# Patient Record
Sex: FEMALE | Race: WHITE | Employment: FULL TIME | URBAN - METROPOLITAN AREA
[De-identification: names, ages, dates, MRNs, and addresses within clinical notes are randomized per-mention and may not be internally consistent; named-entity substitution may affect disease eponyms.]

---

## 2021-03-24 ENCOUNTER — IMMUNIZATION (OUTPATIENT)
Dept: PRIMARY CARE CLINIC | Age: 56
End: 2021-03-24
Payer: COMMERCIAL

## 2021-03-24 PROCEDURE — 91301 COVID-19, MODERNA VACCINE 100MCG/0.5ML DOSE: CPT | Performed by: NURSE PRACTITIONER

## 2021-03-24 PROCEDURE — 0011A COVID-19, MODERNA VACCINE 100MCG/0.5ML DOSE: CPT | Performed by: NURSE PRACTITIONER

## 2021-04-21 ENCOUNTER — IMMUNIZATION (OUTPATIENT)
Dept: PRIMARY CARE CLINIC | Age: 56
End: 2021-04-21
Payer: COMMERCIAL

## 2021-04-21 PROCEDURE — 0012A COVID-19, MODERNA VACCINE 100MCG/0.5ML DOSE: CPT | Performed by: NURSE PRACTITIONER

## 2021-04-21 PROCEDURE — 91301 COVID-19, MODERNA VACCINE 100MCG/0.5ML DOSE: CPT | Performed by: NURSE PRACTITIONER

## 2022-03-10 ENCOUNTER — HOSPITAL ENCOUNTER (EMERGENCY)
Age: 57
Discharge: HOME | End: 2022-03-10
Payer: COMMERCIAL

## 2022-03-10 VITALS
OXYGEN SATURATION: 96 % | BODY MASS INDEX: 36.65 KG/M2 | RESPIRATION RATE: 18 BRPM | HEIGHT: 65 IN | WEIGHT: 220 LBS | HEART RATE: 99 BPM | TEMPERATURE: 98.8 F | SYSTOLIC BLOOD PRESSURE: 142 MMHG | DIASTOLIC BLOOD PRESSURE: 83 MMHG

## 2022-03-10 DIAGNOSIS — H65.02 NON-RECURRENT ACUTE SEROUS OTITIS MEDIA OF LEFT EAR: Primary | ICD-10-CM

## 2022-03-10 PROCEDURE — G0463 HOSPITAL OUTPT CLINIC VISIT: HCPCS

## 2022-03-10 PROCEDURE — 99211 OFF/OP EST MAY X REQ PHY/QHP: CPT

## 2022-03-10 RX ORDER — PREDNISONE 20 MG/1
TABLET ORAL
Qty: 10 TABLET | Refills: 0 | Status: SHIPPED | OUTPATIENT
Start: 2022-03-10 | End: 2022-03-15 | Stop reason: ALTCHOICE

## 2022-03-10 RX ORDER — ROSUVASTATIN CALCIUM 20 MG/1
20 TABLET, COATED ORAL DAILY
COMMUNITY

## 2022-03-10 RX ORDER — ACETAMINOPHEN 500 MG
1000 TABLET ORAL EVERY 6 HOURS PRN
COMMUNITY

## 2022-03-10 RX ORDER — PSEUDOEPHEDRINE HCL 60 MG/1
60 TABLET ORAL 2 TIMES DAILY
Qty: 10 TABLET | Refills: 0 | Status: SHIPPED | OUTPATIENT
Start: 2022-03-10 | End: 2022-03-15 | Stop reason: ALTCHOICE

## 2022-03-10 ASSESSMENT — PAIN - FUNCTIONAL ASSESSMENT: PAIN_FUNCTIONAL_ASSESSMENT: 0-10

## 2022-03-10 ASSESSMENT — PAIN DESCRIPTION - FREQUENCY: FREQUENCY: CONTINUOUS

## 2022-03-10 ASSESSMENT — PAIN DESCRIPTION - PROGRESSION: CLINICAL_PROGRESSION: GRADUALLY WORSENING

## 2022-03-10 ASSESSMENT — PAIN DESCRIPTION - LOCATION: LOCATION: EAR;NECK;THROAT

## 2022-03-10 ASSESSMENT — PAIN DESCRIPTION - PAIN TYPE: TYPE: ACUTE PAIN

## 2022-03-10 ASSESSMENT — PAIN DESCRIPTION - DESCRIPTORS: DESCRIPTORS: ACHING;PRESSURE;SORE

## 2022-03-10 ASSESSMENT — PAIN SCALES - GENERAL: PAINLEVEL_OUTOF10: 9

## 2022-03-10 ASSESSMENT — PAIN DESCRIPTION - ORIENTATION: ORIENTATION: LEFT

## 2022-03-10 ASSESSMENT — PAIN DESCRIPTION - ONSET: ONSET: GRADUAL

## 2022-03-10 NOTE — ED PROVIDER NOTES
[address] [facility]  Department of Emergency Medicine  UC Encounter Note  3/10/22   4:01 PM EST      NAME: Balta Degroot  :  1965  MRN:  [id number]    Chief Complaint: Otalgia (left ear), Neck Pain, and Pharyngitis      This is a 67-year-old female the presents to urgent care complaining of decreased hearing to her left ear. She does have history of conductive hearing loss in the past and has seen ENT for this. She does complain of a humming or ringing or even a high-pitched sound in the left ear at times. She says about a week and a half ago she was placed on an antibiotic because she was having URI symptoms and a cough. Those have improved for the most part however she has noticed that she has had some decreased hearing in the left ear more than her usual.  She does not wear hearing aids at this time. She denies any chest pain or shortness of breath. She does complain of a slight sore throat and some slight muscle neck discomfort. On first contact patient she appears to be in no acute distress. Review of Systems  Pertinent positives and negatives are stated within HPI, all other systems reviewed and are negative. Physical Exam  Vitals and nursing note reviewed. Constitutional:       Appearance: She is well-developed. HENT:      Head: Normocephalic and atraumatic. Jaw: There is normal jaw occlusion. Right Ear: Hearing and external ear normal. Tympanic membrane is bulging. Left Ear: Hearing and external ear normal. Tympanic membrane is bulging. Nose: Rhinorrhea present. No congestion. Right Sinus: No maxillary sinus tenderness or frontal sinus tenderness. Left Sinus: No maxillary sinus tenderness or frontal sinus tenderness. Mouth/Throat:      Mouth: Mucous membranes are moist. No angioedema. Pharynx: Oropharynx is clear. Uvula midline.    Eyes:      General: Lids are normal.      Conjunctiva/sclera: Conjunctivae normal. Pupils: Pupils are equal, round, and reactive to light. Cardiovascular:      Rate and Rhythm: Normal rate and regular rhythm. Heart sounds: Normal heart sounds. No murmur heard. Pulmonary:      Effort: Pulmonary effort is normal.      Breath sounds: Normal breath sounds. Abdominal:      General: Bowel sounds are normal.      Palpations: Abdomen is soft. Abdomen is not rigid. Tenderness: There is no abdominal tenderness. There is no guarding or rebound. Musculoskeletal:      Cervical back: Normal range of motion and neck supple. No edema. Muscular tenderness present. No spinous process tenderness. Skin:     General: Skin is warm and dry. Findings: No abrasion or rash. Neurological:      General: No focal deficit present. Mental Status: She is alert and oriented to person, place, and time. GCS: GCS eye subscore is 4. GCS verbal subscore is 5. GCS motor subscore is 6. Cranial Nerves: No cranial nerve deficit. Sensory: No sensory deficit. Coordination: Coordination normal.      Gait: Gait normal.         Procedures    MDM  Number of Diagnoses or Management Options  Non-recurrent acute serous otitis media of left ear  Diagnosis management comments: This is a patient that is in no acute distress. Looks like she had recovered from some type of upper respiratory and bronchitis problem recently. She does have some fluid buildup behind her TMs. This may be contributing to the decreased hearing also she does have a history of conductive hearing loss. She states in the past that she has benefited from steroid use. I will place her on a steroid also I will place her on a decongestant. Instructions given. Told to follow-up with her primary care provider or ENT on-call.   Instructions given.           --------------------------------------------- PAST HISTORY ---------------------------------------------  Past Medical History:  has a past medical history of Hyperlipidemia. Past Surgical History:  has a past surgical history that includes Ear surgery and  section. Social History:  reports that she has never smoked. She has never used smokeless tobacco.    Family History: family history is not on file. The patients home medications have been reviewed. Allergies: Codeine    -------------------------------------------------- RESULTS -------------------------------------------------  No results found for this visit on 03/10/22. No orders to display       ------------------------- NURSING NOTES AND VITALS REVIEWED ---------------------------   The nursing notes within the ED encounter and vital signs as below have been reviewed. BP (!) 142/83   Pulse 99   Temp 98.8 °F (37.1 °C) (Infrared)   Resp 18   Ht 5' 5\" (1.651 m)   Wt 220 lb (99.8 kg)   SpO2 96%   BMI 36.61 kg/m²   Oxygen Saturation Interpretation: Normal      ------------------------------------------ PROGRESS NOTES ------------------------------------------   I have spoken with the patient and discussed todays results, in addition to providing specific details for the plan of care and counseling regarding the diagnosis and prognosis. Their questions are answered at this time and they are agreeable with the plan.      --------------------------------- ADDITIONAL PROVIDER NOTES ---------------------------------     This patient is stable for discharge. I have shared the specific conditions for return, as well as the importance of follow-up. * NOTE: This report was transcribed using voice recognition software. Every effort was made to ensure accuracy; however, inadvertent computerized transcription errors may be present.    --------------------------------- IMPRESSION AND DISPOSITION ---------------------------------    IMPRESSION  1.  Non-recurrent acute serous otitis media of left ear        DISPOSITION  Disposition: Discharge to home  Patient condition is good        Magan CHAPARRO ALDA Heard  03/10/22 [id number]

## 2022-03-14 ENCOUNTER — TELEPHONE (OUTPATIENT)
Dept: ADMINISTRATIVE | Age: 57
End: 2022-03-14

## 2022-03-14 NOTE — TELEPHONE ENCOUNTER
Please advise scheduling ED follow up 3/10 for dx  Non-recurrent acute serous otitis media of lf ear. Patient states she is having a hard time hearing.

## 2022-03-15 ENCOUNTER — OFFICE VISIT (OUTPATIENT)
Dept: ENT CLINIC | Age: 57
End: 2022-03-15
Payer: COMMERCIAL

## 2022-03-15 ENCOUNTER — PROCEDURE VISIT (OUTPATIENT)
Dept: AUDIOLOGY | Age: 57
End: 2022-03-15
Payer: COMMERCIAL

## 2022-03-15 VITALS — BODY MASS INDEX: 37.32 KG/M2 | WEIGHT: 224 LBS | HEIGHT: 65 IN

## 2022-03-15 DIAGNOSIS — H90.5 SENSORINEURAL HEARING LOSS (SNHL) OF LEFT EAR, UNSPECIFIED HEARING STATUS ON CONTRALATERAL SIDE: Primary | ICD-10-CM

## 2022-03-15 DIAGNOSIS — H90.5 SENSORINEURAL HEARING LOSS (SNHL) OF LEFT EAR, UNSPECIFIED HEARING STATUS ON CONTRALATERAL SIDE: ICD-10-CM

## 2022-03-15 DIAGNOSIS — H90.5 CENTRAL HEARING LOSS: Primary | ICD-10-CM

## 2022-03-15 PROCEDURE — 99204 OFFICE O/P NEW MOD 45 MIN: CPT | Performed by: OTOLARYNGOLOGY

## 2022-03-15 PROCEDURE — 92567 TYMPANOMETRY: CPT | Performed by: AUDIOLOGIST

## 2022-03-15 PROCEDURE — 92557 COMPREHENSIVE HEARING TEST: CPT | Performed by: AUDIOLOGIST

## 2022-03-15 RX ORDER — AMOXICILLIN AND CLAVULANATE POTASSIUM 875; 125 MG/1; MG/1
1 TABLET, FILM COATED ORAL 2 TIMES DAILY
Qty: 14 TABLET | Refills: 0 | Status: SHIPPED | OUTPATIENT
Start: 2022-03-15 | End: 2022-03-22

## 2022-03-15 RX ORDER — PREDNISONE 20 MG/1
20 TABLET ORAL 3 TIMES DAILY
Qty: 30 TABLET | Refills: 0 | Status: SHIPPED | OUTPATIENT
Start: 2022-03-15 | End: 2022-03-25

## 2022-03-15 ASSESSMENT — ENCOUNTER SYMPTOMS
WHEEZING: 0
VOICE CHANGE: 0
SORE THROAT: 0
GASTROINTESTINAL NEGATIVE: 1
COUGH: 0
CHOKING: 0
COLOR CHANGE: 0
STRIDOR: 0
RHINORRHEA: 1
SINUS PRESSURE: 0
SINUS PAIN: 0
TROUBLE SWALLOWING: 0

## 2022-03-15 ASSESSMENT — VISUAL ACUITY: OU: 1

## 2022-03-15 NOTE — PROGRESS NOTES
[facility] Otolaryngology  Dr. Lawrnce Oppenheim. OLGA Gonzalez Ms.Ed. New Consult       Patient Name:  Mahamed Pedraza  :  1965     CHIEF C/O:    Chief Complaint   Patient presents with    Other     had a cold a couple weeks aog had bad cough gave amoxil was doing better now cant hear left ear    Hearing Problem     left hearing muffled was given prednisone and psudafed from [facility] care finished both yesterday;     Sinus Problem     now stuffed back up again; feels worse than went to urgent care       HISTORY OBTAINED FROM:  patient    HISTORY OF PRESENT ILLNESS:       Ingris Coreas is a 61y.o. year old female, here today for hearing loss. Hearing loss occurred suddenly 3/5. Was given prednisone and pseudophed from urgent care. Was recently treated with antibiotics for a cough in February. History of surgery in left ear (PORP). Still cannot hear out of left ear. Denies dizziness      Past Medical History:   Diagnosis Date    Hyperlipidemia      Past Surgical History:   Procedure Laterality Date     SECTION      EAR SURGERY         Current Outpatient Medications:     rosuvastatin (CRESTOR) 20 MG tablet, Take 20 mg by mouth daily, Disp: , Rfl:     Multiple Vitamin (MULTIVITAMINS PO), Take by mouth, Disp: , Rfl:     ZINC PO, Take by mouth, Disp: , Rfl:     Coenzyme Q10 (CO Q 10 PO), Take by mouth, Disp: , Rfl:     acetaminophen (TYLENOL) 500 MG tablet, Take 1,000 mg by mouth every 6 hours as needed for Pain, Disp: , Rfl:   Codeine  Social History     Tobacco Use    Smoking status: Never Smoker    Smokeless tobacco: Never Used   Substance Use Topics    Alcohol use: Not on file    Drug use: Not on file     No family history on file. Review of Systems   Constitutional: Negative for activity change, fatigue and fever. HENT: Positive for congestion, hearing loss, postnasal drip, rhinorrhea and tinnitus.  Negative for ear discharge, ear pain, nosebleeds, sinus pressure, sinus pain, sore throat, trouble swallowing and voice change. Respiratory: Negative for cough, choking, wheezing and stridor. Cardiovascular: Negative for chest pain. Gastrointestinal: Negative. Genitourinary: Negative. Skin: Negative for color change and rash. Neurological: Negative for speech difficulty, light-headedness, numbness and headaches. Hematological: Negative for adenopathy. Psychiatric/Behavioral: Negative for behavioral problems. Ht 5' 5\" (1.651 m)   Wt 224 lb (101.6 kg)   BMI 37.28 kg/m²   Physical Exam  Constitutional:       Appearance: Normal appearance. She is normal weight. HENT:      Head: Normocephalic and atraumatic. Right Ear: Tympanic membrane, ear canal and external ear normal. No drainage. Left Ear: Tympanic membrane, ear canal and external ear normal. No drainage. Nose: Nose normal. No nasal deformity or septal deviation. Comments: Purulent drainage to both nasal cavities. Mouth/Throat:      Mouth: Mucous membranes are moist.   Eyes:      General: Lids are normal. Vision grossly intact. Extraocular Movements: Extraocular movements intact. Conjunctiva/sclera: Conjunctivae normal.      Pupils: Pupils are equal, round, and reactive to light. Cardiovascular:      Rate and Rhythm: Normal rate. Pulmonary:      Effort: Pulmonary effort is normal.   Musculoskeletal:         General: Normal range of motion. Cervical back: Normal range of motion. Lymphadenopathy:      Cervical: No cervical adenopathy. Skin:     Capillary Refill: Capillary refill takes less than 2 seconds. Neurological:      Mental Status: She is alert. Psychiatric:         Mood and Affect: Mood normal.         IMPRESSION/PLAN:  Patient seen and examined, audiogram reviewed showing severe to profound left sided SNHL. Also suspect bacterial sinus infection, recently on amoxil so given augemntin x 7 days. 60mg prednisone for 10 days.  Follow up in 2 weeks with repeat audiogram.       Aj Blackmon. Carlos ESPINOZA MsMarilou Cat Urbina.  Otolaryngology Facial Plastic Surgery  :[facility] Otolaryngology/Facial Plastic Surgery Residency  Associate Clinical Professor:  Unique Castro  1965      I have discussed the case, including pertinent history and exam findings with the resident. I have seen and examined the patient and the key elements of the encounter have been performed by me. I agree with the assessment, plan and orders as documented by the resident. Patient here for follow up of medical problems. Remainder of medical problems as per resident note.       [address] [facility],   3/31/22

## 2022-03-16 NOTE — PROGRESS NOTES
Mahamed Pedraza was referred for audiometric/tympanometric testing by Dr. Valeria Lance due to hearing loss of the left ear. Patient reported decreased hearing in her left ear which occurred suddenly earlier this month, also experiencing left sided tinnitus. She had an upper respiratory infection during this time period. Patient reported history of left ear surgery (PORP) at outside practice. Denied otalgia, otorrhea, aural fullness, and vertigo. Audiometry revealed an essentially mild unspecified hearing loss in the right ear (could not mask bone conduction, over masking) and moderately-severe rising to mild at 1000 Hz falling to profound mixed hearing loss in the left ear. Reliability was good. Speech reception threshold was in good agreement with the pure tone average in the right ear and in poor agreement in the left ear (very poor speech discrimination). Speech discrimination scores were excellent in the right ear and very poor in the left ear. Tympanometry revealed normal middle ear peak pressure and compliance, bilaterally. The results were reviewed with the patient. Recommendations for follow up will be made pending physician consult.     Yeimi Young, [address] [facility] [id number]    Electronically signed by Yeimi Young on 3/16/2022 at 1:53 PM

## 2022-03-29 ENCOUNTER — PROCEDURE VISIT (OUTPATIENT)
Dept: AUDIOLOGY | Age: 57
End: 2022-03-29
Payer: COMMERCIAL

## 2022-03-29 ENCOUNTER — OFFICE VISIT (OUTPATIENT)
Dept: ENT CLINIC | Age: 57
End: 2022-03-29
Payer: COMMERCIAL

## 2022-03-29 VITALS — HEIGHT: 65 IN | BODY MASS INDEX: 37.32 KG/M2 | WEIGHT: 224 LBS

## 2022-03-29 DIAGNOSIS — H90.A32 MIXED CONDUCTIVE AND SENSORINEURAL HEARING LOSS OF LEFT EAR WITH RESTRICTED HEARING OF RIGHT EAR: Primary | ICD-10-CM

## 2022-03-29 DIAGNOSIS — H90.5 SENSORINEURAL HEARING LOSS (SNHL) OF LEFT EAR, UNSPECIFIED HEARING STATUS ON CONTRALATERAL SIDE: Primary | ICD-10-CM

## 2022-03-29 PROCEDURE — 92567 TYMPANOMETRY: CPT | Performed by: AUDIOLOGIST

## 2022-03-29 PROCEDURE — 99213 OFFICE O/P EST LOW 20 MIN: CPT | Performed by: OTOLARYNGOLOGY

## 2022-03-29 PROCEDURE — 92553 AUDIOMETRY AIR & BONE: CPT | Performed by: AUDIOLOGIST

## 2022-03-29 ASSESSMENT — ENCOUNTER SYMPTOMS
GASTROINTESTINAL NEGATIVE: 1
WHEEZING: 0
COLOR CHANGE: 0
SINUS PRESSURE: 0
CHOKING: 0
COUGH: 0
TROUBLE SWALLOWING: 0
SINUS PAIN: 0
RHINORRHEA: 1
VOICE CHANGE: 0
SORE THROAT: 0
STRIDOR: 0

## 2022-03-29 ASSESSMENT — VISUAL ACUITY: OU: 1

## 2022-03-29 NOTE — PROGRESS NOTES
This patient was referred for audiometric/tympanometric testing by Dr. Antelmo Orozco due to two week follow up for sudden hearing loss in her left ear. Patient reported no change since her last visit and her left ear still feels muffled like underwater and she still has difficulty hearing in her left ear. Audiometry revealed hearing within normal limits through 3000 Hz, sloping to moderate unspecified hearing loss through 8000 Hz (could not mask bone conduction) in the right ear, and moderate mixed hearing loss through 500 Hz, sloping to profound through 8000 Hz, with a mild notch at 1000 Hz, in the left ear. Reliability was good. Speech reception thresholds were in good agreement with the pure tone average, in the right ear, and in poor agreement with the pure tone average in the left ear. Tympanometry revealed normal middle ear peak pressure and compliance, bilaterally. The results were reviewed with the patient. Recommendations for follow up will be made pending physician consult.     MARION Mckenzie Doctor of Audiology Intern    Nyasia Corral, [address] [facility]  Electronically signed by Yeimi Rg on 3/29/2022 at 11:57 AM

## 2022-05-10 ENCOUNTER — PROCEDURE VISIT (OUTPATIENT)
Dept: AUDIOLOGY | Age: 57
End: 2022-05-10

## 2022-05-10 ENCOUNTER — OFFICE VISIT (OUTPATIENT)
Dept: ENT CLINIC | Age: 57
End: 2022-05-10
Payer: COMMERCIAL

## 2022-05-10 VITALS
DIASTOLIC BLOOD PRESSURE: 83 MMHG | BODY MASS INDEX: 36.82 KG/M2 | HEIGHT: 65 IN | WEIGHT: 221 LBS | HEART RATE: 86 BPM | SYSTOLIC BLOOD PRESSURE: 132 MMHG

## 2022-05-10 DIAGNOSIS — H93.12 TINNITUS OF LEFT EAR: ICD-10-CM

## 2022-05-10 DIAGNOSIS — H90.A32 MIXED CONDUCTIVE AND SENSORINEURAL HEARING LOSS OF LEFT EAR WITH RESTRICTED HEARING OF RIGHT EAR: Primary | ICD-10-CM

## 2022-05-10 DIAGNOSIS — H90.5 SENSORINEURAL HEARING LOSS (SNHL) OF LEFT EAR, UNSPECIFIED HEARING STATUS ON CONTRALATERAL SIDE: Primary | ICD-10-CM

## 2022-05-10 DIAGNOSIS — H91.20 SUDDEN-ONSET SENSORINEURAL HEARING LOSS: ICD-10-CM

## 2022-05-10 PROCEDURE — 99999 PR OFFICE/OUTPT VISIT,PROCEDURE ONLY: CPT | Performed by: AUDIOLOGIST

## 2022-05-10 PROCEDURE — 99213 OFFICE O/P EST LOW 20 MIN: CPT | Performed by: OTOLARYNGOLOGY

## 2022-05-10 ASSESSMENT — ENCOUNTER SYMPTOMS
VOICE CHANGE: 0
RHINORRHEA: 1
COLOR CHANGE: 0
WHEEZING: 0
SORE THROAT: 0
TROUBLE SWALLOWING: 0
STRIDOR: 0
COUGH: 0
SINUS PAIN: 0
CHOKING: 0
GASTROINTESTINAL NEGATIVE: 1
SINUS PRESSURE: 0

## 2022-05-10 ASSESSMENT — VISUAL ACUITY: OU: 1

## 2022-05-10 NOTE — PROGRESS NOTES
Patient was here for hearing aid evaluation. Discussed amplification options. Patient had questions about Baha, reviewed that information. Also discussed cochlear implant option. She was interested in learning more about CI. Discussed setting up appointment with Dr Chava Aragon. She was interested in this. Reviewed plan with Dr Clarence Limon. Also discussed hearing aid coverage. Patient will call her insurance to see if she has any coverage.    Dr Roni Beach MA will call patient to schedule CI eval.    Amelia Moreno, [address]  Electronically signed by Yeimi Shankar on 5/10/2022 at 1:49 PM

## 2022-05-30 NOTE — PROGRESS NOTES
NEW PATIENT VISIT  Chief Complaint   Patient presents with    Follow-up     Hearing Loss,Tinnitus     History of Present Illness:     Ashlee Dodson is a 61 y.o. female presenting with bilateral Hearing loss occurred suddenly 3/5/2022, L>>R. Was given prednisone and pseudophed from urgent care. Was recently treated with antibiotics for a cough in February. History of surgery in left ear (PORP). Still cannot hear out of left ear. Denies dizziness; had MRI which was normal             TOBACCO  Social History     Tobacco Use   Smoking Status Never Smoker   Smokeless Tobacco Never Used        ALCOHOL   Social History     Substance and Sexual Activity   Alcohol Use None        [address]   Social History     Substance and Sexual Activity   Drug Use Not on file        CURRENT OUTPATIENT MEDICATIONS:   Outpatient Medications Marked as Taking for the 6/2/22 encounter (Office Visit) with Torsten Moise MD   Medication Sig Dispense Refill    rosuvastatin (CRESTOR) 20 MG tablet Take 20 mg by mouth daily      Multiple Vitamin (MULTIVITAMINS PO) Take by mouth      ZINC PO Take by mouth      Coenzyme Q10 (CO Q 10 PO) Take by mouth      acetaminophen (TYLENOL) 500 MG tablet Take 1,000 mg by mouth every 6 hours as needed for Pain          ALLERGIES:   Allergies   Allergen Reactions    Codeine Swelling and Rash       Timing Age of Onset 3/5/2022   Duration Increasing in Severity Yes   Days of work missed in last year n/a      Modifying Factors Seasonal variation No        Associated Symptoms Mouth breathing No    Communication concerns Yes    Halitosis No     Family History Family members with similar conditions No   Family history of bleeding concerns No   Family history of anesthia concerns No        Review of Symptoms:  I have reviewed the patient's medical history in detail; there are no changes to the history as noted in the electronic medical record.        Ht 5' 5\" (1.651 m)   Wt 220 lb (99.8 kg)   LMP  (LMP Unknown)   BMI 36.61 kg/m²     Physical Exam    Allergies Allergies   Allergen Reactions    Codeine Swelling and Rash      Constitutional Retractions/cyanosis {No     Head and Face   Lesions or masses No  facies symmetrical Yes   Eyes Ocular motion with gaze alignment Yes   Ears Inspection: Scars, lesions or masses No   Otoscopy  EAC patent bilaterally without occlusion External ears normal. Canals clear. TM's normal.      Nasal Inspection    No external Scars, lesions or masses    Pyriform apertures widely patent    Nasal musosa healthy   Septum Midline, no Septal Perforation, no septal hematoma   Turbinates Intact, healthy    Oral Cavity Lips no lesions    Teeth healthy without cavities    Gums no lesions    Oral mucosa healthy    Hard and Soft Palate no lesions    Uvula single fid     Tongue no lesions    Tonsils normal size Symmetric without exudate   Neck . Neck supple without tenderness or crepitus   Lymph  Cranial Nerve exam No palpable adenopathy  Grossly intact. CN VII symmetrical   Respiration Symmetric without Increased work of breathing    Cardiovacular No Cyanosis    Skin healthy   Diagnostics    Test ordered No orders of the defined types were placed in this encounter. Review of existing tests No results found for: WBC, HGB, HCT, PLT, MCV, MCH, MCHC, RDW, NEUTOPHILPCT, LYMPHOPCT, MONOPCT, EOSRELPCT, BASOPCT, NEUTROABS, LYMPHSABS, MONOABSOL, EOSABS, BASOABPOC     Old records  Reviewed   Discussion with other providers    Done     On this date 6/2/2022 I have spent 10 minutes reviewing previous notes, test results and 60 min face to face with the patient discussing the diagnosis and importance of compliance with the treatment plan as well as documenting on the day of the visit.     A/P    Impression   Neto Palacios is a 61 y.o. female with Bilateral sensorineural hearing loss L>>R with incredibly poor speech discrimination on the left confirmed by Audiogram, who will benefit from trial of hearing aids.  The rest of the exam was unremarkable    Plan  We discussed the options for management: observation, hearing aid/bi cros/Baha and CI  We had a lengthy discussion about options for management  She is going to try and hearing aid trial and get her audiologic CI evaluation  We will proceed based on the results of those  She is currently out of the window for transtympanic injection  We will obtain old records    Elza Hernandez MD 5/30/22 2:45 PM EDT

## 2022-06-02 ENCOUNTER — OFFICE VISIT (OUTPATIENT)
Dept: ENT CLINIC | Age: 57
End: 2022-06-02
Payer: COMMERCIAL

## 2022-06-02 ENCOUNTER — PROCEDURE VISIT (OUTPATIENT)
Dept: AUDIOLOGY | Age: 57
End: 2022-06-02

## 2022-06-02 VITALS — HEIGHT: 65 IN | BODY MASS INDEX: 36.65 KG/M2 | WEIGHT: 220 LBS

## 2022-06-02 DIAGNOSIS — H90.3 ASYMMETRIC SNHL (SENSORINEURAL HEARING LOSS): ICD-10-CM

## 2022-06-02 DIAGNOSIS — H90.3 BILATERAL SENSORINEURAL HEARING LOSS: Primary | ICD-10-CM

## 2022-06-02 DIAGNOSIS — H90.5 SENSORINEURAL HEARING LOSS (SNHL) OF LEFT EAR, UNSPECIFIED HEARING STATUS ON CONTRALATERAL SIDE: Primary | ICD-10-CM

## 2022-06-02 PROCEDURE — 99417 PROLNG OP E/M EACH 15 MIN: CPT | Performed by: OTOLARYNGOLOGY

## 2022-06-02 PROCEDURE — 99999 PR OFFICE/OUTPT VISIT,PROCEDURE ONLY: CPT | Performed by: AUDIOLOGIST

## 2022-06-02 PROCEDURE — 99215 OFFICE O/P EST HI 40 MIN: CPT | Performed by: OTOLARYNGOLOGY

## 2022-06-02 NOTE — PROGRESS NOTES
Patient was here for hearing aid evaluation. Patient would like to self pay for hearing aids at this time. She will trial for 30 days and follow up with decision. Will order two KASEY hearing aids, power mold on the left, in chroma beige. At hearing aid fitting, will also do full CI eval. Patient is agreeable to this plan.      Yeimi Puente CCC-A  [address] [facility] [id number]  Electronically signed by Yeimi Puente on 6/2/2022 at 2:48 PM

## 2022-07-07 ENCOUNTER — PROCEDURE VISIT (OUTPATIENT)
Dept: AUDIOLOGY | Age: 57
End: 2022-07-07
Payer: COMMERCIAL

## 2022-07-07 DIAGNOSIS — H90.A32 MIXED CONDUCTIVE AND SENSORINEURAL HEARING LOSS OF LEFT EAR WITH RESTRICTED HEARING OF RIGHT EAR: Primary | ICD-10-CM

## 2022-07-07 PROCEDURE — 92626 EVAL AUD FUNCJ 1ST HOUR: CPT | Performed by: AUDIOLOGIST

## 2022-07-07 NOTE — PROGRESS NOTES
Patient was here for hearing aid fitting and cochlear implant candidacy testing. Hearing aids were verified and testing was completed first, then hearing aids were adjusted for patient comfort. CI EVALUATION:     Previous audiometric testing revealed hearing within normal limits through 3000 Hz, sloping to moderate unspecified hearing loss through 8000 Hz (could not mask bone conduction) in the right ear, and moderate mixed hearing loss through 500 Hz, sloping to profound through 8000 Hz, with a mild notch at 1000 Hz, in the left ear. Patient's hearing aids were programmed to using the NAL-NL2 fitting rationale. Hearing aids were verified and found to be appropriate for patient's hearing loss. Results    RIGHT    CNC Words 96%   AzBio +5 74%     LEFT, right masked    CNC Words 0%   AzBio Quiet 0%     BILATERAL    CNC Words 96%   AzBio +5 80%       Note: At least 35 minutes spent face to face with patient collecting case history, performing tests, and reviewing results. HEARING AID FITTING:     Fit with binaural  RITE  hearing aids. Instructed in use and care. Gave  battery charger, warranty information and scheduled  2 week check for 7/19 at [facility]. Made following adjustments: changed from 105 speaker and mold to 100 speaker and power dome on left, due to patient report of echoing/plugged feeling. Decreased gain significantly. Hearing aid contract/battery warning form reviewed and signed. Patient will try both hearing aids, will discuss decision. Counseled extensively on expectations for hearing aids.       Yeimi Amanda [facility]-A  [address] [facility] [id number]  Electronically signed by Yeimi Amanda on 7/7/2022 at 4:50 PM

## 2022-07-19 ENCOUNTER — PROCEDURE VISIT (OUTPATIENT)
Dept: AUDIOLOGY | Age: 57
End: 2022-07-19

## 2022-07-19 DIAGNOSIS — H90.A32 MIXED CONDUCTIVE AND SENSORINEURAL HEARING LOSS OF LEFT EAR WITH RESTRICTED HEARING OF RIGHT EAR: Primary | ICD-10-CM

## 2022-07-19 PROCEDURE — 99999 PR OFFICE/OUTPT VISIT,PROCEDURE ONLY: CPT | Performed by: AUDIOLOGIST

## 2022-07-19 NOTE — PROGRESS NOTES
Patient was here for two week follow up. She reported frustration with sound quality and very limited benefit , counseled on expectations. Decreased again significantly for patient comfort. After adjustments, she reported less static, distortion. Counseled extensively on limitations of HA. Patient also said she had been looking more into cochlear implants. Discussed the process a little more and expectations for CI/listening training. Patient would like to speak with current recipients. Will give her information to reps. Patient will try hearing aids with adjustments for two more weeks. She will follow up again 8/2 at [facility]. Will bring CROS devices to this appointment.      Yeimi Wilkins CCC-A  [address] [facility] [id number]  Electronically signed by Yeimi Wilkins on 7/20/2022 at 9:41 AM

## 2022-08-02 ENCOUNTER — PROCEDURE VISIT (OUTPATIENT)
Dept: AUDIOLOGY | Age: 57
End: 2022-08-02

## 2022-08-02 DIAGNOSIS — H90.A22 SENSORINEURAL HEARING LOSS (SNHL) OF LEFT EAR WITH RESTRICTED HEARING OF RIGHT EAR: Primary | ICD-10-CM

## 2022-08-02 PROCEDURE — 99024 POSTOP FOLLOW-UP VISIT: CPT | Performed by: AUDIOLOGIST

## 2022-08-02 NOTE — PROGRESS NOTES
Patient was here for hearing aid check. She reported continued dissatisfaction with hearing aids. Had previously told patient that if we have time at this appointment, we can try CROS demo. Did not have CROS devices in office at this time. Patient did not want to adjust hearing aids and wanted to demo CROS only. Will follow up next week with CROS devices for decision. Asked if patient had spoken with CI recipients, she said they had reached out but she had not had time to respond. She said she was still interested in talking to them, but would want to hold off on the surgery for now. She reported she had done research on cochlear implants.      Yeimi Mcdonald [facility]-A  [address] [facility] [id number]  Electronically signed by Yeimi Mcdonald on 8/3/2022 at 8:04 AM

## 2022-08-09 ENCOUNTER — PROCEDURE VISIT (OUTPATIENT)
Dept: AUDIOLOGY | Age: 57
End: 2022-08-09

## 2022-08-09 DIAGNOSIS — H90.A22 SENSORINEURAL HEARING LOSS (SNHL) OF LEFT EAR WITH RESTRICTED HEARING OF RIGHT EAR: Primary | ICD-10-CM

## 2022-08-09 PROCEDURE — 99024 POSTOP FOLLOW-UP VISIT: CPT | Performed by: AUDIOLOGIST

## 2022-08-09 NOTE — PROGRESS NOTES
Patient was here for hearing aid follow up and to try CROS hearing aids. She reported not noticing any differences in office, but confirmed could hear microphone sounds on opposite ear. She will try CROS hearing aids for one week and follow up next Tuesday at [facility] with decision. Reviewed that this will either be return for credit day or billing day. Patient said she understood.      Yeimi Dillon [facility]  [address] [facility] [id number]  Electronically signed by Yeimi Dillon on 8/9/2022 at 4:38 PM

## 2022-08-16 ENCOUNTER — PROCEDURE VISIT (OUTPATIENT)
Dept: AUDIOLOGY | Age: 57
End: 2022-08-16

## 2022-08-16 DIAGNOSIS — H90.A22 SENSORINEURAL HEARING LOSS (SNHL) OF LEFT EAR WITH RESTRICTED HEARING OF RIGHT EAR: Primary | ICD-10-CM

## 2022-08-16 PROCEDURE — 99024 POSTOP FOLLOW-UP VISIT: CPT | Performed by: AUDIOLOGIST

## 2022-08-16 NOTE — PROGRESS NOTES
Patient reported doing better with CROS hearing aids than with traditional bilateral hearing aids. She asked if they could be turned up on both sides. Increased gain bilaterally. Patient will try for one more week .     Ye[facility] Bernal [facility]  [address] [facility]  Electronically signed by Yeimi Bernal on 8/16/2022 at 12:29 PM

## 2022-08-23 ENCOUNTER — PROCEDURE VISIT (OUTPATIENT)
Dept: AUDIOLOGY | Age: 57
End: 2022-08-23

## 2022-08-23 DIAGNOSIS — H90.A22 SENSORINEURAL HEARING LOSS (SNHL) OF LEFT EAR WITH RESTRICTED HEARING OF RIGHT EAR: Primary | ICD-10-CM

## 2022-08-23 PROCEDURE — 99024 POSTOP FOLLOW-UP VISIT: CPT | Performed by: AUDIOLOGIST

## 2022-08-23 NOTE — PROGRESS NOTES
Patient would like to keep HA on right and CROS on left. Will order CROS for left, will RFC left HA. Patient will follow up on 9/6 for CROS fitting. She will pay at that time.      Yeimi Barrientos [facility]A  [address] [facility] [id number]  Electronically signed by Yeimi Barrientos on 8/23/2022 at 12:20 PM

## 2022-09-06 ENCOUNTER — PROCEDURE VISIT (OUTPATIENT)
Dept: AUDIOLOGY | Age: 57
End: 2022-09-06

## 2022-09-06 DIAGNOSIS — H90.A22 SENSORINEURAL HEARING LOSS (SNHL) OF LEFT EAR WITH RESTRICTED HEARING OF RIGHT EAR: Primary | ICD-10-CM

## 2022-09-06 PROCEDURE — 99024 POSTOP FOLLOW-UP VISIT: CPT | Performed by: AUDIOLOGIST

## 2022-09-06 NOTE — PROGRESS NOTES
Patient was here to return hearing aids. She returned CROS and RITE. Gave her name of hearing aids if she decides to go elsewhere. She will follow up as needed.     [facility]  [address] [facility] NICOLE.[id number]  Electronically signed by Yeimi Moyer on 9/6/2022 at 4:51 PM

## 2025-08-28 ENCOUNTER — HOSPITAL ENCOUNTER (OUTPATIENT)
Dept: MAMMOGRAPHY | Age: 60
Discharge: HOME | End: 2025-08-30
Attending: OBSTETRICS & GYNECOLOGY

## 2025-08-28 DIAGNOSIS — Z12.31 SCREENING MAMMOGRAM FOR BREAST CANCER: ICD-10-CM
